# Patient Record
Sex: MALE | Race: WHITE | Employment: FULL TIME | URBAN - METROPOLITAN AREA
[De-identification: names, ages, dates, MRNs, and addresses within clinical notes are randomized per-mention and may not be internally consistent; named-entity substitution may affect disease eponyms.]

---

## 2017-01-11 ENCOUNTER — OFFICE VISIT (OUTPATIENT)
Dept: SURGERY | Facility: CLINIC | Age: 42
End: 2017-01-11

## 2017-01-11 ENCOUNTER — TELEPHONE (OUTPATIENT)
Dept: SURGERY | Facility: CLINIC | Age: 42
End: 2017-01-11

## 2017-01-11 VITALS
RESPIRATION RATE: 20 BRPM | TEMPERATURE: 97 F | HEART RATE: 65 BPM | BODY MASS INDEX: 33.13 KG/M2 | HEIGHT: 73 IN | WEIGHT: 250 LBS | DIASTOLIC BLOOD PRESSURE: 73 MMHG | SYSTOLIC BLOOD PRESSURE: 128 MMHG

## 2017-01-11 DIAGNOSIS — Z98.52 VASECTOMY STATUS: Primary | ICD-10-CM

## 2017-01-11 DIAGNOSIS — R35.1 NOCTURIA: ICD-10-CM

## 2017-01-11 DIAGNOSIS — Z30.09 VASECTOMY EVALUATION: Primary | ICD-10-CM

## 2017-01-11 DIAGNOSIS — Z84.1 FAMILY HISTORY OF KIDNEY STONE: ICD-10-CM

## 2017-01-11 DIAGNOSIS — Z80.42 FAMILY HISTORY OF PROSTATE CANCER: ICD-10-CM

## 2017-01-11 PROCEDURE — 99213 OFFICE O/P EST LOW 20 MIN: CPT | Performed by: UROLOGY

## 2017-01-11 PROCEDURE — 99244 OFF/OP CNSLTJ NEW/EST MOD 40: CPT | Performed by: UROLOGY

## 2017-01-11 RX ORDER — DIAZEPAM 5 MG/1
TABLET ORAL
Qty: 3 TABLET | Refills: 0 | Status: SHIPPED | OUTPATIENT
Start: 2017-01-11 | End: 2017-06-16

## 2017-01-11 RX ORDER — HYDROCODONE BITARTRATE AND ACETAMINOPHEN 5; 325 MG/1; MG/1
TABLET ORAL
Qty: 20 TABLET | Refills: 0 | Status: SHIPPED | OUTPATIENT
Start: 2017-01-11 | End: 2017-06-16

## 2017-01-11 RX ORDER — CEFADROXIL 500 MG/1
CAPSULE ORAL
Qty: 10 CAPSULE | Refills: 0 | Status: SHIPPED | OUTPATIENT
Start: 2017-01-11 | End: 2017-06-16

## 2017-01-11 NOTE — PATIENT INSTRUCTIONS
1.  Please continue  birth control precautions without exception, every time you have intercourse,  until further notice    2. Proceed with plans for voluntary sterilization -- bilateral vasectomy     3.   NO aspirin or NSAIDs (medications such as Advil, M On the other hand,  if you choose to have the vasectomy at   the surgery center , [facility],    please take the diazepam and hydrocodone 30 minutes before the actual start time of the procedure and not necessarily when you arrive at th

## 2017-01-11 NOTE — PROGRESS NOTES
Donald Alonzo is a 39year old male. HPI:   Patient presents with:  Vasectomy: No urinary concerns. Sexually active. Has 2 children. History provided by patient.       DESIRES VOLUNTARY STERILIZATION                 Patient is 39 y.o  and his wif HISTORY:  Past Medical History   Diagnosis Date   • Contusion      as child      History reviewed. No pertinent past surgical history.    Family History   Problem Relation Age of Onset   • Genito-Urinary Disorder Father      prostatitis   • Hypertensi bruising  Integumentary:  Negative for pruritus and rash  Musculoskeletal:  Negative for bone/joint symptoms  Psychiatric:  Negative for inappropriate interaction and psychiatric symptoms  Respiratory:  Negative for cough, dyspnea and wheezing      PHYSICA is 32.99 kg/(m^2).         LABORATORIES    12/6/16 PSA 0.8  12/6/16 Creatinine 1.13, eGFR >60      UROLOGICAL IMAGING     None    I explained to patient and he understands that there was one research group that argued that there is an increased risk of pros leave it up to the patient to decide and asked him to let us know if he wants to do that, and we would give him the order for a post vasectomy semen analysis.   I explained to him and he also understands that this operation should be viewed as irreversible (primary encounter diagnosis)  Plan: Please see below        Diagnoses and discussion---           1)  Voluntary sterilization --- By the end of the evaluation, patient decides that he definitely wants to undergo voluntary sterilization.   I explained to grady (medications such as Advil, Motrin, Aleve, ibuprofen) for 10 days before the procedure and none for the first 2 days after the procedure. It is okay to take Tylenol or acetaminophen, but not other painkillers,   before the procedure    4.   Please stop ome necessarily when you arrive at the surgery center. 13.  Lab tests --urinalysis urine test today         Gibson Kennedy  is a very pleasant patient and I thoroughly enjoyed evaluating him  in consultation. I thank you for sending the patient to see me.   I

## 2017-01-11 NOTE — TELEPHONE ENCOUNTER
Spoke with Reza Moran at [facility] a Ref # [id number] was given.

## 2017-01-12 ENCOUNTER — TELEPHONE (OUTPATIENT)
Dept: SURGERY | Facility: CLINIC | Age: 42
End: 2017-01-12

## 2017-01-12 NOTE — TELEPHONE ENCOUNTER
Called patient informing patient that there's a opening tomorrow in the office for jody. Patient returned call and stated that he'll wait until February 9, which was original date. Patient was very thankful.     Thanks, Wes Zurita

## 2017-01-12 NOTE — TELEPHONE ENCOUNTER
Shankar Godinez,     This procedure is considered an in-office procedure, his HMO manage organization will not allow it in the surgery center. This information can be better explained by the management organization, [facility] that manages his policy.      Thank you,   T

## 2017-02-08 ENCOUNTER — TELEPHONE (OUTPATIENT)
Dept: SURGERY | Facility: CLINIC | Age: 42
End: 2017-02-08

## 2017-02-08 NOTE — TELEPHONE ENCOUNTER
Received call from surgery center stating that the referral that is in the system is for in office that the referral needs to be changed to the [place] [facility] center.  Referral was edited and submitted to [facility] for approval. After further review th

## 2017-04-18 ENCOUNTER — TELEPHONE (OUTPATIENT)
Dept: SURGERY | Facility: CLINIC | Age: 42
End: 2017-04-18

## 2017-04-21 NOTE — TELEPHONE ENCOUNTER
Called patient informed that procedure should be over by 10:00. Patient agreed answered all questions.  Thanks

## 2017-04-28 ENCOUNTER — OFFICE VISIT (OUTPATIENT)
Dept: SURGERY | Facility: CLINIC | Age: 42
End: 2017-04-28

## 2017-04-28 VITALS
RESPIRATION RATE: 16 BRPM | WEIGHT: 250 LBS | DIASTOLIC BLOOD PRESSURE: 76 MMHG | HEART RATE: 69 BPM | TEMPERATURE: 97 F | SYSTOLIC BLOOD PRESSURE: 108 MMHG | HEIGHT: 72.5 IN | BODY MASS INDEX: 33.49 KG/M2

## 2017-04-28 DIAGNOSIS — Z30.8 POSTVASECTOMY SPERM COUNT: ICD-10-CM

## 2017-04-28 DIAGNOSIS — Z30.2 ENCOUNTER FOR VASECTOMY: Primary | ICD-10-CM

## 2017-04-28 DIAGNOSIS — Z30.2 ENCOUNTER FOR STERILIZATION: ICD-10-CM

## 2017-04-28 PROCEDURE — 55250 REMOVAL OF SPERM DUCT(S): CPT | Performed by: UROLOGY

## 2017-04-28 PROCEDURE — 88302 TISSUE EXAM BY PATHOLOGIST: CPT | Performed by: UROLOGY

## 2017-04-28 PROCEDURE — 99070 SPECIAL SUPPLIES PHYS/QHP: CPT | Performed by: UROLOGY

## 2017-04-28 PROCEDURE — A4550 SURGICAL TRAYS: HCPCS | Performed by: UROLOGY

## 2017-04-28 NOTE — PATIENT INSTRUCTIONS
.1.   Please continue birth control precautions until further notice. 2.   Please apply ice pack to operative area intermittently today. 3.   Please finish your antibiotic as directed    4.    For pain control in the first 24 hours, take either Tyleno

## 2017-05-12 ENCOUNTER — TELEPHONE (OUTPATIENT)
Dept: FAMILY MEDICINE CLINIC | Facility: CLINIC | Age: 42
End: 2017-05-12

## 2017-05-12 ENCOUNTER — TELEPHONE (OUTPATIENT)
Dept: SURGERY | Facility: CLINIC | Age: 42
End: 2017-05-12

## 2017-05-12 DIAGNOSIS — Z98.52 S/P VASECTOMY: Primary | ICD-10-CM

## 2017-05-12 NOTE — TELEPHONE ENCOUNTER
Pt calling regarding needing a referral for Dr. Sasha Edwards. ... please advise when referral is ready

## 2017-05-19 NOTE — TELEPHONE ENCOUNTER
Spoke with pt and gave an appt for 6/13 at 2pm and rhe said that he has the containers and the orders for the semen analysis and I gave instructions for collection and where to bring the specimen.

## 2017-05-24 ENCOUNTER — TELEPHONE (OUTPATIENT)
Dept: SURGERY | Facility: CLINIC | Age: 42
End: 2017-05-24

## 2017-05-24 NOTE — TELEPHONE ENCOUNTER
Spoke with pt and informed that we need to reschd. His appt from tues. 6/13 at 1:40pm to to Friday 6/16 at 2:40pm and pt agreed.

## 2017-06-13 ENCOUNTER — TELEPHONE (OUTPATIENT)
Dept: SURGERY | Facility: CLINIC | Age: 42
End: 2017-06-13

## 2017-06-13 ENCOUNTER — APPOINTMENT (OUTPATIENT)
Dept: LAB | Facility: HOSPITAL | Age: 42
End: 2017-06-13
Attending: UROLOGY
Payer: COMMERCIAL

## 2017-06-13 DIAGNOSIS — Z30.8 POSTVASECTOMY SPERM COUNT: ICD-10-CM

## 2017-06-13 PROCEDURE — 89321 SEMEN ANAL SPERM DETECTION: CPT

## 2017-06-13 NOTE — TELEPHONE ENCOUNTER
pts wife called. Her  has a vasectomy recently. They both have questions as to what the next step is. They are both confused.

## 2017-06-16 ENCOUNTER — OFFICE VISIT (OUTPATIENT)
Dept: SURGERY | Facility: CLINIC | Age: 42
End: 2017-06-16

## 2017-06-16 VITALS
HEART RATE: 73 BPM | BODY MASS INDEX: 33.49 KG/M2 | HEIGHT: 72.5 IN | SYSTOLIC BLOOD PRESSURE: 128 MMHG | RESPIRATION RATE: 18 BRPM | WEIGHT: 250 LBS | DIASTOLIC BLOOD PRESSURE: 77 MMHG | TEMPERATURE: 98 F

## 2017-06-16 DIAGNOSIS — Z98.52 S/P VASECTOMY: ICD-10-CM

## 2017-06-16 DIAGNOSIS — Z30.8 POSTVASECTOMY SPERM COUNT: Primary | ICD-10-CM

## 2017-06-16 NOTE — PATIENT INSTRUCTIONS
1.   Continue birth control precautions without exception    2.   Please submit another postvasectomy semen analysis in 6 weeks; after submitting specimen, please leave message with office the following day that you are calling for the results and we will g

## 2017-06-16 NOTE — PROGRESS NOTES
The patient comes in for his first visit after his office bilateral vasectomy several weeks ago; 4/30/17. Wife on speaker phone. He has no complaints. He denies any significant pain. He denies any scrotal swelling or wound infections.    On physical exam, t 59. He was advised to start testing 10 years prior or at age 54 as AUA guidelines recommend. He indicates his understanding. Treatment Plan & Patient Instructions  1. Continue birth control precautions without exception    2.   Please submit another post

## 2017-07-17 ENCOUNTER — APPOINTMENT (OUTPATIENT)
Dept: LAB | Facility: HOSPITAL | Age: 42
End: 2017-07-17
Attending: UROLOGY
Payer: COMMERCIAL

## 2017-07-17 DIAGNOSIS — Z30.8 POSTVASECTOMY SPERM COUNT: ICD-10-CM

## 2017-07-17 PROCEDURE — 89321 SEMEN ANAL SPERM DETECTION: CPT

## 2017-07-19 ENCOUNTER — TELEPHONE (OUTPATIENT)
Dept: SURGERY | Facility: CLINIC | Age: 42
End: 2017-07-19

## 2017-07-21 NOTE — TELEPHONE ENCOUNTER
Please call patient's wife or patient back and let them know that I sent patient lab results by means of \"my chart\" and please asked them to look at that.   Many thanks, Dr. Sourav [address]

## 2018-07-19 ENCOUNTER — TELEPHONE (OUTPATIENT)
Dept: SURGERY | Facility: CLINIC | Age: 43
End: 2018-07-19

## 2018-07-19 ENCOUNTER — APPOINTMENT (OUTPATIENT)
Dept: LAB | Facility: HOSPITAL | Age: 43
End: 2018-07-19
Attending: NURSE PRACTITIONER
Payer: COMMERCIAL

## 2018-07-19 DIAGNOSIS — Z98.52 STATUS POST VASECTOMY: ICD-10-CM

## 2018-07-19 PROCEDURE — 89321 SEMEN ANAL SPERM DETECTION: CPT

## 2018-09-07 ENCOUNTER — OFFICE VISIT (OUTPATIENT)
Dept: FAMILY MEDICINE CLINIC | Facility: CLINIC | Age: 43
End: 2018-09-07

## 2018-09-07 VITALS
DIASTOLIC BLOOD PRESSURE: 68 MMHG | WEIGHT: 251 LBS | TEMPERATURE: 98 F | BODY MASS INDEX: 34 KG/M2 | SYSTOLIC BLOOD PRESSURE: 103 MMHG | HEART RATE: 67 BPM

## 2018-09-07 DIAGNOSIS — M76.61 ACHILLES TENDINITIS, RIGHT LEG: Primary | ICD-10-CM

## 2018-09-07 PROCEDURE — 99213 OFFICE O/P EST LOW 20 MIN: CPT | Performed by: FAMILY MEDICINE

## 2018-09-07 PROCEDURE — 99212 OFFICE O/P EST SF 10 MIN: CPT | Performed by: FAMILY MEDICINE

## 2018-09-07 RX ORDER — METHYLPREDNISOLONE 4 MG/1
TABLET ORAL
Qty: 1 PACKAGE | Refills: 0 | Status: SHIPPED | OUTPATIENT
Start: 2018-09-07 | End: 2018-09-25

## 2018-09-07 NOTE — PROGRESS NOTES
HPI:    Patient ID: Zay Sanders is a 43year old male. HPI  Patient presents with a complaint of right ankle pain discomfort he was playing softball after hitting the ball and running to first base he felt a sudden strain down his calf and ankle.   L

## 2018-09-13 ENCOUNTER — OFFICE VISIT (OUTPATIENT)
Dept: FAMILY MEDICINE CLINIC | Facility: CLINIC | Age: 43
End: 2018-09-13

## 2018-09-13 VITALS
WEIGHT: 251 LBS | BODY MASS INDEX: 34 KG/M2 | DIASTOLIC BLOOD PRESSURE: 73 MMHG | SYSTOLIC BLOOD PRESSURE: 112 MMHG | HEART RATE: 61 BPM

## 2018-09-13 DIAGNOSIS — M76.61 ACHILLES TENDINITIS, RIGHT LEG: Primary | ICD-10-CM

## 2018-09-13 PROCEDURE — 99214 OFFICE O/P EST MOD 30 MIN: CPT | Performed by: FAMILY MEDICINE

## 2018-09-13 PROCEDURE — 99212 OFFICE O/P EST SF 10 MIN: CPT | Performed by: FAMILY MEDICINE

## 2018-09-13 RX ORDER — DICLOFENAC SODIUM 75 MG/1
75 TABLET, DELAYED RELEASE ORAL 2 TIMES DAILY
Qty: 60 TABLET | Refills: 1 | Status: ON HOLD | OUTPATIENT
Start: 2018-09-13 | End: 2018-09-26

## 2018-09-13 NOTE — PROGRESS NOTES
HPI:    Patient ID: Yohannes Flanagan is a 43year old male. HPI  Patient presents with: Foot Pain: f/u right achilles tendonitis  only little improvement on medication and relative rest.   Review of Systems   Constitutional: Negative.     Musculoskeletal

## 2018-09-20 ENCOUNTER — HOSPITAL ENCOUNTER (OUTPATIENT)
Dept: GENERAL RADIOLOGY | Facility: HOSPITAL | Age: 43
Discharge: HOME | End: 2018-09-20
Attending: FAMILY MEDICINE
Payer: COMMERCIAL

## 2018-09-20 ENCOUNTER — HOSPITAL ENCOUNTER (OUTPATIENT)
Dept: MRI IMAGING | Facility: HOSPITAL | Age: 43
Discharge: HOME | End: 2018-09-20
Attending: FAMILY MEDICINE
Payer: COMMERCIAL

## 2018-09-20 DIAGNOSIS — M76.61 ACHILLES TENDINITIS, RIGHT LEG: ICD-10-CM

## 2018-09-20 PROCEDURE — 73721 MRI JNT OF LWR EXTRE W/O DYE: CPT | Performed by: FAMILY MEDICINE

## 2018-09-20 PROCEDURE — 73610 X-RAY EXAM OF ANKLE: CPT | Performed by: FAMILY MEDICINE

## 2018-09-21 ENCOUNTER — TELEPHONE (OUTPATIENT)
Dept: FAMILY MEDICINE CLINIC | Facility: CLINIC | Age: 43
End: 2018-09-21

## 2018-09-21 NOTE — TELEPHONE ENCOUNTER
Patient has an Achilles tendon tear on MRI. Needs to be seen next week. Has a later date appointment with podiatry but I need him in next week . If podiatry cant then prefer to see Dr. Arpan Monzon.

## 2018-09-22 NOTE — TELEPHONE ENCOUNTER
Spoke with patient and made him aware of Dr. Jose Bowman message. Patient voiced understanding and an appointment was scheduled for 9/24/18 for follow-up. Message also routed to podiatry to confirm if patient can be seen sooner than 10/3/18.

## 2018-09-24 ENCOUNTER — TELEPHONE (OUTPATIENT)
Dept: INTERNAL MEDICINE CLINIC | Facility: CLINIC | Age: 43
End: 2018-09-24

## 2018-09-24 DIAGNOSIS — S86.019S RUPTURE OF ACHILLES TENDON, UNSPECIFIED LATERALITY, SEQUELA: Primary | ICD-10-CM

## 2018-09-24 NOTE — TELEPHONE ENCOUNTER
Pt has an appt on 9/25 with Dr Marshall Reaves for an achilles tendon tear. Pt needs a referral to be seen.  Thank you

## 2018-09-25 ENCOUNTER — HOSPITAL ENCOUNTER (OUTPATIENT)
Dept: ULTRASOUND IMAGING | Facility: HOSPITAL | Age: 43
Discharge: HOME | End: 2018-09-25
Attending: ORTHOPAEDIC SURGERY
Payer: COMMERCIAL

## 2018-09-25 ENCOUNTER — OFFICE VISIT (OUTPATIENT)
Dept: ORTHOPEDICS CLINIC | Facility: CLINIC | Age: 43
End: 2018-09-25

## 2018-09-25 DIAGNOSIS — S86.011A ACHILLES RUPTURE, RIGHT, INITIAL ENCOUNTER: ICD-10-CM

## 2018-09-25 DIAGNOSIS — S86.011A ACHILLES RUPTURE, RIGHT, INITIAL ENCOUNTER: Primary | ICD-10-CM

## 2018-09-25 PROCEDURE — 93971 EXTREMITY STUDY: CPT | Performed by: ORTHOPAEDIC SURGERY

## 2018-09-25 PROCEDURE — L4360 PNEUMAT WALKING BOOT PRE CST: HCPCS | Performed by: ORTHOPAEDIC SURGERY

## 2018-09-25 PROCEDURE — 99243 OFF/OP CNSLTJ NEW/EST LOW 30: CPT | Performed by: ORTHOPAEDIC SURGERY

## 2018-09-25 PROCEDURE — 99212 OFFICE O/P EST SF 10 MIN: CPT | Performed by: ORTHOPAEDIC SURGERY

## 2018-09-25 NOTE — H&P
9/25/2018  Supriya Hernandez  9/23/1975  37year old   male  Barak Stone MD    HPI:   Patient presents with:   Injury: Right ankle - onset 2 weeks ago while playing softball - has x-rays and MRI in the system - still has discomfort rated as 2-8 tobacco: Never Used      Tobacco comment: 4 CIGARETTES/DAY    Alcohol use: No      Alcohol/week: 0.0 oz      Comment: formerly-beer    Drug use: No          REVIEW OF SYSTEMS:   A 12 point review of systems was performed as documented on the intake form an neurovascular injury, need for further surgery, development of deep vein thrombosis, pulmonary emboli, and anesthesia problems. The patient understands the risks and wishes to proceed with surgery.        All of their questions were answered and they are in

## 2018-09-26 ENCOUNTER — HOSPITAL ENCOUNTER (OUTPATIENT)
Facility: HOSPITAL | Age: 43
Setting detail: HOSPITAL OUTPATIENT SURGERY
Discharge: HOME | End: 2018-09-26
Attending: ORTHOPAEDIC SURGERY | Admitting: ORTHOPAEDIC SURGERY
Payer: COMMERCIAL

## 2018-09-26 ENCOUNTER — ANESTHESIA EVENT (OUTPATIENT)
Dept: SURGERY | Facility: HOSPITAL | Age: 43
End: 2018-09-26
Payer: COMMERCIAL

## 2018-09-26 ENCOUNTER — ANESTHESIA (OUTPATIENT)
Dept: SURGERY | Facility: HOSPITAL | Age: 43
End: 2018-09-26
Payer: COMMERCIAL

## 2018-09-26 ENCOUNTER — TELEPHONE (OUTPATIENT)
Dept: ORTHOPEDICS CLINIC | Facility: CLINIC | Age: 43
End: 2018-09-26

## 2018-09-26 VITALS
TEMPERATURE: 98 F | SYSTOLIC BLOOD PRESSURE: 114 MMHG | OXYGEN SATURATION: 98 % | WEIGHT: 250 LBS | HEIGHT: 73 IN | RESPIRATION RATE: 16 BRPM | DIASTOLIC BLOOD PRESSURE: 61 MMHG | HEART RATE: 60 BPM | BODY MASS INDEX: 33.13 KG/M2

## 2018-09-26 PROCEDURE — 0LQN0ZZ REPAIR RIGHT LOWER LEG TENDON, OPEN APPROACH: ICD-10-PCS | Performed by: ORTHOPAEDIC SURGERY

## 2018-09-26 PROCEDURE — 99152 MOD SED SAME PHYS/QHP 5/>YRS: CPT | Performed by: ORTHOPAEDIC SURGERY

## 2018-09-26 PROCEDURE — 76942 ECHO GUIDE FOR BIOPSY: CPT | Performed by: ORTHOPAEDIC SURGERY

## 2018-09-26 PROCEDURE — 64447 NJX AA&/STRD FEMORAL NRV IMG: CPT | Performed by: ORTHOPAEDIC SURGERY

## 2018-09-26 PROCEDURE — 3E0T3BZ INTRODUCTION OF ANESTHETIC AGENT INTO PERIPHERAL NERVES AND PLEXI, PERCUTANEOUS APPROACH: ICD-10-PCS | Performed by: ANESTHESIOLOGY

## 2018-09-26 RX ORDER — FAMOTIDINE 20 MG/1
20 TABLET ORAL ONCE
Status: COMPLETED | OUTPATIENT
Start: 2018-09-26 | End: 2018-09-26

## 2018-09-26 RX ORDER — SODIUM CHLORIDE, SODIUM LACTATE, POTASSIUM CHLORIDE, CALCIUM CHLORIDE 600; 310; 30; 20 MG/100ML; MG/100ML; MG/100ML; MG/100ML
INJECTION, SOLUTION INTRAVENOUS CONTINUOUS
Status: DISCONTINUED | OUTPATIENT
Start: 2018-09-26 | End: 2018-09-26

## 2018-09-26 RX ORDER — HALOPERIDOL 5 MG/ML
0.25 INJECTION INTRAMUSCULAR ONCE AS NEEDED
Status: DISCONTINUED | OUTPATIENT
Start: 2018-09-26 | End: 2018-09-26

## 2018-09-26 RX ORDER — LIDOCAINE HYDROCHLORIDE 10 MG/ML
INJECTION, SOLUTION EPIDURAL; INFILTRATION; INTRACAUDAL; PERINEURAL AS NEEDED
Status: DISCONTINUED | OUTPATIENT
Start: 2018-09-26 | End: 2018-09-26 | Stop reason: SURG

## 2018-09-26 RX ORDER — CEFAZOLIN SODIUM/WATER 2 G/20 ML
2 SYRINGE (ML) INTRAVENOUS ONCE
Status: DISCONTINUED | OUTPATIENT
Start: 2018-09-26 | End: 2018-09-26 | Stop reason: HOSPADM

## 2018-09-26 RX ORDER — ASPIRIN 325 MG
325 TABLET ORAL DAILY
Qty: 14 TABLET | Refills: 0 | Status: SHIPPED | OUTPATIENT
Start: 2018-09-26 | End: 2018-11-06

## 2018-09-26 RX ORDER — ROPIVACAINE HYDROCHLORIDE 5 MG/ML
INJECTION, SOLUTION EPIDURAL; INFILTRATION; PERINEURAL
Status: COMPLETED | OUTPATIENT
Start: 2018-09-26 | End: 2018-09-26

## 2018-09-26 RX ORDER — ONDANSETRON 2 MG/ML
4 INJECTION INTRAMUSCULAR; INTRAVENOUS ONCE AS NEEDED
Status: DISCONTINUED | OUTPATIENT
Start: 2018-09-26 | End: 2018-09-26

## 2018-09-26 RX ORDER — ONDANSETRON 2 MG/ML
4 INJECTION INTRAMUSCULAR; INTRAVENOUS EVERY 6 HOURS PRN
Status: CANCELLED | OUTPATIENT
Start: 2018-09-26

## 2018-09-26 RX ORDER — ONDANSETRON 2 MG/ML
INJECTION INTRAMUSCULAR; INTRAVENOUS AS NEEDED
Status: DISCONTINUED | OUTPATIENT
Start: 2018-09-26 | End: 2018-09-26 | Stop reason: SURG

## 2018-09-26 RX ORDER — MORPHINE SULFATE 4 MG/ML
2 INJECTION, SOLUTION INTRAMUSCULAR; INTRAVENOUS EVERY 10 MIN PRN
Status: DISCONTINUED | OUTPATIENT
Start: 2018-09-26 | End: 2018-09-26

## 2018-09-26 RX ORDER — DEXAMETHASONE SODIUM PHOSPHATE 4 MG/ML
VIAL (ML) INJECTION AS NEEDED
Status: DISCONTINUED | OUTPATIENT
Start: 2018-09-26 | End: 2018-09-26 | Stop reason: SURG

## 2018-09-26 RX ORDER — NALOXONE HYDROCHLORIDE 0.4 MG/ML
80 INJECTION, SOLUTION INTRAMUSCULAR; INTRAVENOUS; SUBCUTANEOUS AS NEEDED
Status: DISCONTINUED | OUTPATIENT
Start: 2018-09-26 | End: 2018-09-26

## 2018-09-26 RX ORDER — HYDROCODONE BITARTRATE AND ACETAMINOPHEN 5; 325 MG/1; MG/1
1-2 TABLET ORAL EVERY 6 HOURS PRN
Qty: 40 TABLET | Refills: 0 | Status: SHIPPED | OUTPATIENT
Start: 2018-09-26 | End: 2018-11-06

## 2018-09-26 RX ORDER — HYDROCODONE BITARTRATE AND ACETAMINOPHEN 5; 325 MG/1; MG/1
1 TABLET ORAL AS NEEDED
Status: DISCONTINUED | OUTPATIENT
Start: 2018-09-26 | End: 2018-09-26

## 2018-09-26 RX ORDER — CEFAZOLIN SODIUM/WATER 2 G/20 ML
SYRINGE (ML) INTRAVENOUS AS NEEDED
Status: DISCONTINUED | OUTPATIENT
Start: 2018-09-26 | End: 2018-09-26 | Stop reason: SURG

## 2018-09-26 RX ORDER — ACETAMINOPHEN 500 MG
1000 TABLET ORAL ONCE
Status: COMPLETED | OUTPATIENT
Start: 2018-09-26 | End: 2018-09-26

## 2018-09-26 RX ORDER — ROCURONIUM BROMIDE 10 MG/ML
INJECTION, SOLUTION INTRAVENOUS AS NEEDED
Status: DISCONTINUED | OUTPATIENT
Start: 2018-09-26 | End: 2018-09-26 | Stop reason: SURG

## 2018-09-26 RX ORDER — LIDOCAINE HYDROCHLORIDE 10 MG/ML
INJECTION, SOLUTION INFILTRATION; PERINEURAL
Status: COMPLETED | OUTPATIENT
Start: 2018-09-26 | End: 2018-09-26

## 2018-09-26 RX ORDER — DEXAMETHASONE SODIUM PHOSPHATE 10 MG/ML
INJECTION, SOLUTION INTRAMUSCULAR; INTRAVENOUS
Status: COMPLETED | OUTPATIENT
Start: 2018-09-26 | End: 2018-09-26

## 2018-09-26 RX ORDER — METOCLOPRAMIDE 10 MG/1
10 TABLET ORAL ONCE
Status: COMPLETED | OUTPATIENT
Start: 2018-09-26 | End: 2018-09-26

## 2018-09-26 RX ORDER — HYDROCODONE BITARTRATE AND ACETAMINOPHEN 5; 325 MG/1; MG/1
2 TABLET ORAL AS NEEDED
Status: DISCONTINUED | OUTPATIENT
Start: 2018-09-26 | End: 2018-09-26

## 2018-09-26 RX ORDER — MORPHINE SULFATE 4 MG/ML
4 INJECTION, SOLUTION INTRAMUSCULAR; INTRAVENOUS EVERY 10 MIN PRN
Status: DISCONTINUED | OUTPATIENT
Start: 2018-09-26 | End: 2018-09-26

## 2018-09-26 RX ORDER — MAGNESIUM HYDROXIDE 1200 MG/15ML
LIQUID ORAL CONTINUOUS PRN
Status: COMPLETED | OUTPATIENT
Start: 2018-09-26 | End: 2018-09-26

## 2018-09-26 RX ORDER — MIDAZOLAM HYDROCHLORIDE 1 MG/ML
INJECTION INTRAMUSCULAR; INTRAVENOUS
Status: COMPLETED | OUTPATIENT
Start: 2018-09-26 | End: 2018-09-26

## 2018-09-26 RX ORDER — MORPHINE SULFATE 10 MG/ML
6 INJECTION, SOLUTION INTRAMUSCULAR; INTRAVENOUS EVERY 10 MIN PRN
Status: DISCONTINUED | OUTPATIENT
Start: 2018-09-26 | End: 2018-09-26

## 2018-09-26 RX ADMIN — ROPIVACAINE HYDROCHLORIDE 30 ML: 5 INJECTION, SOLUTION EPIDURAL; INFILTRATION; PERINEURAL at 10:49:00

## 2018-09-26 RX ADMIN — MIDAZOLAM HYDROCHLORIDE 2 MG: 1 INJECTION INTRAMUSCULAR; INTRAVENOUS at 10:49:00

## 2018-09-26 RX ADMIN — LIDOCAINE HYDROCHLORIDE 3 ML: 10 INJECTION, SOLUTION INFILTRATION; PERINEURAL at 10:49:00

## 2018-09-26 RX ADMIN — ONDANSETRON 4 MG: 2 INJECTION INTRAMUSCULAR; INTRAVENOUS at 11:38:00

## 2018-09-26 RX ADMIN — DEXAMETHASONE SODIUM PHOSPHATE 4 MG: 4 MG/ML VIAL (ML) INJECTION at 11:06:00

## 2018-09-26 RX ADMIN — DEXAMETHASONE SODIUM PHOSPHATE 4 MG: 10 INJECTION, SOLUTION INTRAMUSCULAR; INTRAVENOUS at 10:49:00

## 2018-09-26 RX ADMIN — SODIUM CHLORIDE, SODIUM LACTATE, POTASSIUM CHLORIDE, CALCIUM CHLORIDE: 600; 310; 30; 20 INJECTION, SOLUTION INTRAVENOUS at 11:58:00

## 2018-09-26 RX ADMIN — LIDOCAINE HYDROCHLORIDE 50 MG: 10 INJECTION, SOLUTION EPIDURAL; INFILTRATION; INTRACAUDAL; PERINEURAL at 11:00:00

## 2018-09-26 RX ADMIN — SODIUM CHLORIDE, SODIUM LACTATE, POTASSIUM CHLORIDE, CALCIUM CHLORIDE: 600; 310; 30; 20 INJECTION, SOLUTION INTRAVENOUS at 10:30:00

## 2018-09-26 RX ADMIN — ROCURONIUM BROMIDE 10 MG: 10 INJECTION, SOLUTION INTRAVENOUS at 11:00:00

## 2018-09-26 RX ADMIN — CEFAZOLIN SODIUM/WATER 2 G: 2 G/20 ML SYRINGE (ML) INTRAVENOUS at 11:07:00

## 2018-09-26 NOTE — ANESTHESIA PREPROCEDURE EVALUATION
Anesthesia PreOp Note    HPI:     Aleksandra Alonso is a 37year old male who presents for preoperative consultation requested by: Mohsen Johnson MD    Date of Surgery: 9/26/2018    Procedure(s):  ACHILLES TENDON REPAIR/LENGTHENING  Indication: Relation Age of Onset   • Genito-Urinary Disorder Father         prostatitis   • Hypertension Father    • Genito-Urinary Disorder Paternal Uncle         prostatitis     Social History    Socioeconomic History      Marital status:       Spouse name: 09/26/18  1012   BP:  126/72   BP Location:  Right arm   Pulse:  66   Resp:  18   Temp:  98.6 °F (37 °C)   TempSrc:  Oral   SpO2:  97%   Weight: 113.4 kg (250 lb) 113.4 kg (250 lb)   Height: 1.854 m (6' 1\") 1.854 m (6' 1\")        Anesthesia ROS/Med Hx an

## 2018-09-26 NOTE — TELEPHONE ENCOUNTER
Patient has a procedure that was added on with Dr. Katlyn Navarro for today. Patient has an HMO policy and a referral is required. Please enter a referral for this procedure.      Thank you,     Dana Esposito [phone]   ([phone])

## 2018-09-26 NOTE — BRIEF OP NOTE
Pre-Operative Diagnosis: Rupture of right Achilles tendon [S86.011A]     Post-Operative Diagnosis: Rupture of right Achilles tendon [S86.011A]      Procedure Performed:   Procedure(s):  right achilles tendon repair    Surgeon(s) and Role:     * Brayan Singh

## 2018-09-26 NOTE — OPERATIVE REPORT
[facility]    PATIENT'S NAME: Aisha Hernandez   ATTENDING PHYSICIAN: Robert Scott MD   OPERATING PHYSICIAN: Robert Scott MD   PATIENT ACCOUNT#:   [id number]    LOCATION:  [facility] PACU 5 [facility] 10  MEDICAL RECORD #:   [id number] well padded. The patient had a well-padded tourniquet placed on the right proximal thigh prior to prepping and draping the lower extremity. Prepping and draping of the right lower extremity was then performed. Perioperative antibiotics were infused.   Co MD  d: 09/26/2018 12:20:23  t: 09/26/2018 12:49:01  [facility] [id number]  GD/

## 2018-09-26 NOTE — INTERVAL H&P NOTE
Pre-op Diagnosis: Rupture of right Achilles tendon [S86.011A]    The above referenced H&P was reviewed by Nahun Hussein MD on 9/26/2018, the patient was examined and no significant changes have occurred in the patient's condition since the H&P was pe

## 2018-09-26 NOTE — ANESTHESIA POSTPROCEDURE EVALUATION
Patient: Radha Hernandez    Procedure Summary     Date:  09/26/18 Room / Location:  [address] [place] [address] MAIN OR 08 / [address] [place] [address] MAIN OR    Anesthesia Start:  7394 Anesthesia Stop:  1234    Procedure:  ACHILLES TENDON REPAIR/LENGTHENING (Right ) Diagnosis:       Rupture of

## 2018-09-26 NOTE — ANESTHESIA PROCEDURE NOTES
Peripheral Block  Date/Time: 9/26/2018 10:49 AM    Anesthesiologist:  Adalgisa Castillo MD  Performed by:   Anesthesiologist  Patient Location:  PACU  Start Time:  9/26/2018 10:43 AM  End Time:  9/26/2018 10:52 AM  Site Identification: ultrasound guided,

## 2018-09-28 NOTE — TELEPHONE ENCOUNTER
Theodore Senior, please specify exactly what referral is needed by Dr. [facility]. Thank you.

## 2018-09-29 ENCOUNTER — PATIENT MESSAGE (OUTPATIENT)
Dept: FAMILY MEDICINE CLINIC | Facility: CLINIC | Age: 43
End: 2018-09-29

## 2018-09-30 NOTE — TELEPHONE ENCOUNTER
From: Sharyle Poisson Slocum  To:  Freddie Tsai DO  Sent: 9/29/2018 7:22 PM CDT  Subject: Visit Follow-up Question    May need a referral for Dr. Reece Obregon for my follow up Appointment from having my Achilles tendon repaired surgically

## 2018-10-03 NOTE — TELEPHONE ENCOUNTER
Spoke to Layla and informed her that [employer] does ALL PA for [facility] Ortho surgeries

## 2018-10-09 ENCOUNTER — OFFICE VISIT (OUTPATIENT)
Dept: ORTHOPEDICS CLINIC | Facility: CLINIC | Age: 43
End: 2018-10-09

## 2018-10-09 VITALS — HEART RATE: 92 BPM | SYSTOLIC BLOOD PRESSURE: 128 MMHG | DIASTOLIC BLOOD PRESSURE: 81 MMHG | RESPIRATION RATE: 20 BRPM

## 2018-10-09 DIAGNOSIS — S86.011A ACHILLES RUPTURE, RIGHT, INITIAL ENCOUNTER: Primary | ICD-10-CM

## 2018-10-09 PROCEDURE — 99212 OFFICE O/P EST SF 10 MIN: CPT | Performed by: ORTHOPAEDIC SURGERY

## 2018-10-09 PROCEDURE — 99024 POSTOP FOLLOW-UP VISIT: CPT | Performed by: ORTHOPAEDIC SURGERY

## 2018-10-09 NOTE — PROGRESS NOTES
This is a pleasant 51-year-old male that is 2 weeks status post right Achilles tendon repair. He has had no fevers, chills, chest pain, shortness of breath. He comes in today for repeat evaluation.     On exam, the patient has a healing incision about the

## 2018-11-06 ENCOUNTER — OFFICE VISIT (OUTPATIENT)
Dept: ORTHOPEDICS CLINIC | Facility: CLINIC | Age: 43
End: 2018-11-06

## 2018-11-06 DIAGNOSIS — S86.011A ACHILLES RUPTURE, RIGHT, INITIAL ENCOUNTER: Primary | ICD-10-CM

## 2018-11-06 PROCEDURE — 99212 OFFICE O/P EST SF 10 MIN: CPT | Performed by: ORTHOPAEDIC SURGERY

## 2018-11-06 PROCEDURE — 99024 POSTOP FOLLOW-UP VISIT: CPT | Performed by: ORTHOPAEDIC SURGERY

## 2018-11-07 NOTE — PROGRESS NOTES
This is a pleasant 24-year-old male that is 6 weeks status post right Achilles tendon repair. He has had no chest pain or shortness of breath. He comes in today for repeat evaluation.     On exam, the patient has a well-healed incision over the posterior

## 2018-11-09 ENCOUNTER — OFFICE VISIT (OUTPATIENT)
Dept: PHYSICAL THERAPY | Age: 43
End: 2018-11-09
Attending: FAMILY MEDICINE
Payer: COMMERCIAL

## 2018-11-09 DIAGNOSIS — S86.011A ACHILLES RUPTURE, RIGHT, INITIAL ENCOUNTER: ICD-10-CM

## 2018-11-09 PROCEDURE — 97161 PT EVAL LOW COMPLEX 20 MIN: CPT | Performed by: PHYSICAL THERAPIST

## 2018-11-09 PROCEDURE — 97530 THERAPEUTIC ACTIVITIES: CPT | Performed by: PHYSICAL THERAPIST

## 2018-11-09 PROCEDURE — 97110 THERAPEUTIC EXERCISES: CPT | Performed by: PHYSICAL THERAPIST

## 2018-11-09 NOTE — PROGRESS NOTES
LOWER EXTREMITY EVALUATION:   Referring Physician: Maggie Tam MD    Diagnosis: Achilles rupture, right, initial encounter (A82.120Y) Date of Service: 11/9/2018   Date of Onset: 9/7/2018 Date of Surgery: 9/26/2018      SUBJECTIVE:   PATIENT SUMM weeks.     As patient's ROM increases, may take out heel lifts.     Once patient is footflat, may start progressive WB  goal is to place patient in shoes at week 6 post op.     HEP    Red Flag Screening Comments   Age over 48 N   Bowel or bladder Dysfuncti R: 59.3 L:   57.5       Myotome Testing:   -/5, R/L **Deferred due to post op status**   11/9/2018   Hip Flexion R:  L:     Knee Extension R:  L:   Knee Flexion R:  L:   Ankle DF R:  L:   Ankle Eversion R:  L:   Great Toe Extension R:  L:     Palpation: Te 11/9/2018  To:  2/7/2019    This plan was discussed and agreed upon by: patient   Patient was advised of these findings, precautions, and treatment options and has agreed to actively participate in planning and for this course of care.     Thank you for you

## 2018-11-13 ENCOUNTER — OFFICE VISIT (OUTPATIENT)
Dept: PHYSICAL THERAPY | Age: 43
End: 2018-11-13
Attending: ORTHOPAEDIC SURGERY
Payer: COMMERCIAL

## 2018-11-13 PROCEDURE — 97140 MANUAL THERAPY 1/> REGIONS: CPT

## 2018-11-13 PROCEDURE — 97110 THERAPEUTIC EXERCISES: CPT

## 2018-11-13 NOTE — PROGRESS NOTES
Diagnosis: Achilles rupture, right, initial encounter (I87.474T)    Authorized # of Visits: 12  [facility]            Next MD visit: December 6, 2016       Fall Risk: standard             Precautions:  S/p achilles tendon repair September 26, 2 upon discharge to aide with symptom management and return to previous level of function.    2. 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   3. Patient w

## 2018-11-15 ENCOUNTER — OFFICE VISIT (OUTPATIENT)
Dept: PHYSICAL THERAPY | Age: 43
End: 2018-11-15
Attending: ORTHOPAEDIC SURGERY
Payer: COMMERCIAL

## 2018-11-15 PROCEDURE — 97110 THERAPEUTIC EXERCISES: CPT

## 2018-11-15 PROCEDURE — 97140 MANUAL THERAPY 1/> REGIONS: CPT

## 2018-11-15 NOTE — PROGRESS NOTES
Diagnosis: Achilles rupture, right, initial encounter (Z93.925I)    Authorized # of Visits: 12  Insurance: [employer] [facility]            Next MD visit: December 6, 2016       Fall Risk: standard             Precautions:  S/p achilles tendon repair September 26, 2 20x    Standing hamstring curl (boot) 20x    Great toe flex/ext isometrics (therapist OP) 10 x 5 cts                         HEP: knee bends; SLR (with boot)    HEP: HEP:  HEP:         Manual Therapy:   Prone Soft tissue massage / light effleurage gastroc, TotalTreatment Time: 45 min

## 2018-11-20 ENCOUNTER — OFFICE VISIT (OUTPATIENT)
Dept: PHYSICAL THERAPY | Age: 43
End: 2018-11-20
Attending: ORTHOPAEDIC SURGERY
Payer: COMMERCIAL

## 2018-11-20 PROCEDURE — 97140 MANUAL THERAPY 1/> REGIONS: CPT

## 2018-11-20 PROCEDURE — 97110 THERAPEUTIC EXERCISES: CPT

## 2018-11-20 NOTE — PROGRESS NOTES
Diagnosis: Achilles rupture, right, initial encounter (P48.986I)    Authorized # of Visits: 12  Insurance: [employer]            Next MD visit: December 18, 2016       Fall Risk: standard             Precautions:  S/p achilles tendon repair September 26, 10 cts    Great toe light isometrics 10 x 10 cts     Sdly hip \"boxes\" F/B 15x     Sci Fit (with boot) weight through heel L0 distance 11 x 8 min                           HEP: knee bends; SLR (with boot)    HEP: HEP:  HEP:         Manual Therapy:   Prone

## 2018-11-23 ENCOUNTER — OFFICE VISIT (OUTPATIENT)
Dept: PHYSICAL THERAPY | Age: 43
End: 2018-11-23
Attending: ORTHOPAEDIC SURGERY
Payer: COMMERCIAL

## 2018-11-23 PROCEDURE — 97110 THERAPEUTIC EXERCISES: CPT | Performed by: PHYSICAL THERAPIST

## 2018-11-23 PROCEDURE — 97140 MANUAL THERAPY 1/> REGIONS: CPT | Performed by: PHYSICAL THERAPIST

## 2018-11-23 NOTE — PROGRESS NOTES
Diagnosis: Achilles rupture, right, initial encounter (S41.015E)    Authorized # of Visits: 12  Insurance: [employer] [facility]            Next MD visit: December 18, 2016       Fall Risk: standard             Precautions:  S/p achilles tendon repair September 26, x 10 cts    Great toe light isometrics 10 x 10 cts     Sdly hip \"boxes\" F/B 15x     Sci Fit (with boot) weight through heel L0 distance 11 x 8 min           Therapeutic Exercise    Supine active R ankle DF/PF 20x     Supine active R ankle CW/CCW 15x    R

## 2018-11-27 ENCOUNTER — OFFICE VISIT (OUTPATIENT)
Dept: PHYSICAL THERAPY | Age: 43
End: 2018-11-27
Attending: ORTHOPAEDIC SURGERY
Payer: COMMERCIAL

## 2018-11-27 PROCEDURE — 97140 MANUAL THERAPY 1/> REGIONS: CPT | Performed by: PHYSICAL THERAPIST

## 2018-11-27 PROCEDURE — 97110 THERAPEUTIC EXERCISES: CPT | Performed by: PHYSICAL THERAPIST

## 2018-11-27 PROCEDURE — 97112 NEUROMUSCULAR REEDUCATION: CPT | Performed by: PHYSICAL THERAPIST

## 2018-11-27 NOTE — PROGRESS NOTES
Diagnosis: Achilles rupture, right, initial encounter (Z32.543H)    Authorized # of Visits: 12  Insurance: [employer] [facility]            Next MD visit: December 18, 2016       Fall Risk: standard             Precautions:  S/p achilles tendon repair September 26, isometrics DF/PF; INV/EVR 10 x 10 cts    Great toe light isometrics 10 x 10 cts     Sdly hip \"boxes\" F/B 15x     Sci Fit (with boot) weight through heel L0 distance 11 x 8 min           Therapeutic Exercise    Supine active R ankle DF/PF 20x     Supine a score to less than or equal to 25% impairment, for functional improvement in ADLs.  Currently 66% impaired.      Plan:   Decrease pain/swelling; improve scar mobility, R ankle ROM/mobility, flexibility, strength/stability, balance, gait, and return to work

## 2018-11-30 ENCOUNTER — OFFICE VISIT (OUTPATIENT)
Dept: PHYSICAL THERAPY | Age: 43
End: 2018-11-30
Attending: ORTHOPAEDIC SURGERY
Payer: COMMERCIAL

## 2018-11-30 PROCEDURE — 97140 MANUAL THERAPY 1/> REGIONS: CPT | Performed by: PHYSICAL THERAPIST

## 2018-11-30 PROCEDURE — 97112 NEUROMUSCULAR REEDUCATION: CPT | Performed by: PHYSICAL THERAPIST

## 2018-11-30 PROCEDURE — 97110 THERAPEUTIC EXERCISES: CPT | Performed by: PHYSICAL THERAPIST

## 2018-11-30 NOTE — PROGRESS NOTES
Diagnosis: Achilles rupture, right, initial encounter (K02.037L)    Authorized # of Visits: 12  Insurance: [address] [facility]            Next MD visit: December 18, 2016       Fall Risk: standard             Precautions:  S/p achilles tendon repair September 26, Therapeutic Exercise    Supine active R ankle DF/PF 20x     Supine active R ankle CW/CCW 15x    R ankle light isometrics DF/PF; INV/EVR 10 x 10 cts    Prone hip ext (no boot) 2 x10, bilaterally    Prone knee bends ( no boot) 15x    Sci Fit (with boot) mukul work.  3. Patient will increase strength by one full MMT grade for foot intrinsics and LE musculature to improve foot posture and normalize mechanics for stairclimbing.   4. Patient to improve FOTO outcomes score to less than or equal to 25% impairment, for

## 2018-12-04 ENCOUNTER — OFFICE VISIT (OUTPATIENT)
Dept: PHYSICAL THERAPY | Age: 43
End: 2018-12-04
Attending: ORTHOPAEDIC SURGERY
Payer: COMMERCIAL

## 2018-12-04 PROCEDURE — 97110 THERAPEUTIC EXERCISES: CPT

## 2018-12-04 NOTE — PROGRESS NOTES
Diagnosis: Achilles rupture, right, initial encounter (H38.351C)    Authorized # of Visits: 12  Insurance: [employer] [place] [facility]            Next MD visit: December 18, 2016       Fall Risk: standard             Precautions:  S/p achilles tendon repair September 26, cts    Long sitting R ankle DF/PF; INV/EVR OTB 2 x 15      Sitbacks 2 x 15     Lunges (weight transfers) R forward 2 x 10    Step ups R 15x     Sci Fit (with boot) weight through heel L1, L2 distance 11 x 8 min        Remove final heel lift tomorrow 11/28/

## 2018-12-06 ENCOUNTER — OFFICE VISIT (OUTPATIENT)
Dept: PHYSICAL THERAPY | Age: 43
End: 2018-12-06
Attending: ORTHOPAEDIC SURGERY
Payer: COMMERCIAL

## 2018-12-06 PROCEDURE — 97110 THERAPEUTIC EXERCISES: CPT

## 2018-12-06 NOTE — PROGRESS NOTES
Diagnosis: Achilles rupture, right, initial encounter (D72.773D)    Authorized # of Visits: 12  Insurance: [facility]            Next MD visit: December 18, 2016       Fall Risk: standard             Precautions:  S/p achilles tendon repair September 26, 0 min         Assessment:   Pt will bring his shoe to therapy starting next week to gradually progress from CAM boot to shoe prior to his physician visit. Progression of WB and balance through RLE with CAM boot as well as R hip strengthening.  Noted minim

## 2018-12-10 ENCOUNTER — OFFICE VISIT (OUTPATIENT)
Dept: PHYSICAL THERAPY | Age: 43
End: 2018-12-10
Attending: ORTHOPAEDIC SURGERY
Payer: COMMERCIAL

## 2018-12-10 PROCEDURE — 97110 THERAPEUTIC EXERCISES: CPT | Performed by: PHYSICAL THERAPIST

## 2018-12-10 PROCEDURE — 97112 NEUROMUSCULAR REEDUCATION: CPT | Performed by: PHYSICAL THERAPIST

## 2018-12-11 NOTE — PROGRESS NOTES
LOWER EXTREMITY RE-EVALUATION:   [address] REQUEST     Name: Lima Grijalva   Referring Physician: Deborah Fernandez MD    Diagnosis: Achilles rupture, right, initial encounter (X52.442I) Date of Service: 12/10/2018    Date of Onset: 9/7 Palpation: Tenderness to palpation at posterior lower leg, achilles region         Visit # 7  Date: 11/30/2018  Visit # 8  Date: 12/4/18 Visit # 9  Date: 12/6/18 Visit # 10  Date: 12/10/18   Therapeutic Exercise    Seated active R ankle DF/PF 20x goals. He will benefit from progression of interventions and exercises to restore ROM, strength and mobility, returning patient to PLOF. Initiated weightbearing in shoes today, bilaterally.  Provided cues for weight acceptance, weight shift and gait mechan care.    Thank you for your referral and the opportunity to assist in your patient's rehabilitation. Please co-sign or sign and return this letter via fax as soon as possible to [phone].  If you have any question please contact me at [phone]    [facility]

## 2018-12-13 ENCOUNTER — OFFICE VISIT (OUTPATIENT)
Dept: PHYSICAL THERAPY | Age: 43
End: 2018-12-13
Attending: ORTHOPAEDIC SURGERY
Payer: COMMERCIAL

## 2018-12-13 PROCEDURE — 97116 GAIT TRAINING THERAPY: CPT

## 2018-12-13 PROCEDURE — 97110 THERAPEUTIC EXERCISES: CPT

## 2018-12-13 NOTE — PROGRESS NOTES
Insurance: [employer] [facility]     Next MD visit: December 18, 2016       Fall Risk: standard  Medication Changes since last visit?: No             Precautions:  S/p achilles tendon repair September 26, 2018  Start motion and strenghtening in patient's current arc distance 11 x 8 min    SLS on R 3 x 20 cts     Sitbacks 2 x 15     Lateral walk 4x20 feet    Reassessment completed      Neuromuscular Re-ed  Standing weightshift at barre 2x10: frontal, sagittal in shoe    Block training for weight acceptance at RLE (heel improve scar mobility, R ankle ROM/mobility, flexibility, strength/stability, balance, gait, and facilitate return to work.       Charges: there ex x 2, gait x1  Total Timed Treatment: 42 min      TotalTreatment Time: 45 min

## 2018-12-17 ENCOUNTER — OFFICE VISIT (OUTPATIENT)
Dept: PHYSICAL THERAPY | Age: 43
End: 2018-12-17
Attending: ORTHOPAEDIC SURGERY
Payer: COMMERCIAL

## 2018-12-17 PROCEDURE — 97110 THERAPEUTIC EXERCISES: CPT | Performed by: PHYSICAL THERAPIST

## 2018-12-17 PROCEDURE — 97140 MANUAL THERAPY 1/> REGIONS: CPT | Performed by: PHYSICAL THERAPIST

## 2018-12-17 NOTE — PROGRESS NOTES
Insurance: [employer] [facility]     Next MD visit: December 18, 2016       Fall Risk: standard  Medication Changes since last visit?: No             Precautions:  S/p achilles tendon repair September 26, 2018  Start motion and strenghtening in patient's current arc Therapeutic Exercise    Sci Fit (with shoe) weight through heel L5, distance 11 x 8 min    SLS on R 3 x 20 cts     Sitbacks 2 x 15     Lateral walk 4x20 feet    Reassessment completed      Neuromuscular Re-ed  Standing weightshift at barre 2x10: frontal, s to improve foot posture and normalize mechanics for stairclimbing. 4. Patient to improve FOTO outcomes score to less than or equal to 25% impairment, for functional improvement in ADLs.  Currently 66% impaired.      Plan:   Recommendations to continue PT 2

## 2018-12-18 ENCOUNTER — OFFICE VISIT (OUTPATIENT)
Dept: ORTHOPEDICS CLINIC | Facility: CLINIC | Age: 43
End: 2018-12-18

## 2018-12-18 ENCOUNTER — TELEPHONE (OUTPATIENT)
Dept: ORTHOPEDICS CLINIC | Facility: CLINIC | Age: 43
End: 2018-12-18

## 2018-12-18 DIAGNOSIS — S86.011A ACHILLES RUPTURE, RIGHT, INITIAL ENCOUNTER: Primary | ICD-10-CM

## 2018-12-18 PROCEDURE — 99212 OFFICE O/P EST SF 10 MIN: CPT | Performed by: ORTHOPAEDIC SURGERY

## 2018-12-18 PROCEDURE — 99024 POSTOP FOLLOW-UP VISIT: CPT | Performed by: ORTHOPAEDIC SURGERY

## 2018-12-19 NOTE — PROGRESS NOTES
This is a pleasant 75-year-old male that is 12 weeks status post a right Achilles tendon repair. He has had no fevers, chills, chest pain, shortness of breath. He has been participating in physical therapy and comes in today for repeat evaluation.     On

## 2018-12-20 ENCOUNTER — APPOINTMENT (OUTPATIENT)
Dept: PHYSICAL THERAPY | Age: 43
End: 2018-12-20
Attending: ORTHOPAEDIC SURGERY
Payer: COMMERCIAL

## 2018-12-20 ENCOUNTER — TELEPHONE (OUTPATIENT)
Dept: ORTHOPEDICS CLINIC | Facility: CLINIC | Age: 43
End: 2018-12-20

## 2018-12-20 NOTE — TELEPHONE ENCOUNTER
Pt was suppose to have physical therapy today the 20th and the 27th and wont be able to be seen because the referral was put in for 2019 not 2018 and [facility] wont approve the visits

## 2018-12-26 NOTE — TELEPHONE ENCOUNTER
Jose M Mayer states pt is scheduled for PT tomorrow, needs [facility] referral for 2018, also states office can call [facility] and give a verbal for this, only needs one more visit in 2018. Pls advise thank you.

## 2018-12-26 NOTE — TELEPHONE ENCOUNTER
S/w cesar and she states that when D put order in on 12/18/18 the order got processed for the new year 2019 and now pt hasn't been seen and needs 1 more visit for 2018. Will call [facility] to get them to process.    Called [facility] 3 times, no answer and could not LM

## 2018-12-27 ENCOUNTER — APPOINTMENT (OUTPATIENT)
Dept: PHYSICAL THERAPY | Age: 43
End: 2018-12-27
Attending: ORTHOPAEDIC SURGERY
Payer: COMMERCIAL

## 2019-01-03 ENCOUNTER — OFFICE VISIT (OUTPATIENT)
Dept: PHYSICAL THERAPY | Age: 44
End: 2019-01-03
Attending: ORTHOPAEDIC SURGERY
Payer: COMMERCIAL

## 2019-01-03 DIAGNOSIS — S86.011A ACHILLES RUPTURE, RIGHT, INITIAL ENCOUNTER: ICD-10-CM

## 2019-01-03 PROCEDURE — 97140 MANUAL THERAPY 1/> REGIONS: CPT

## 2019-01-03 PROCEDURE — 97110 THERAPEUTIC EXERCISES: CPT

## 2019-01-03 NOTE — PROGRESS NOTES
Insurance: Mike [employer] [facility]     Next MD visit: December 18, 2016       Fall Risk: standard  Medication Changes since last visit?: No             Precautions:  S/p achilles tendon repair September 26, 2018  Start motion and strenghtening in patient's current arc walk 4x20 feet    Monster walk 4x15 feet    Marching at barre 2x15      Neuromuscular Re-ed  Reformer (RY) heel raises; modified footwork Therapeutic Exercise    Gastroc stretch with towel 5 x 20 cts     R step up forward/lateral 20x     Neuromuscular Re-e

## 2019-01-07 ENCOUNTER — OFFICE VISIT (OUTPATIENT)
Dept: PHYSICAL THERAPY | Age: 44
End: 2019-01-07
Attending: ORTHOPAEDIC SURGERY
Payer: COMMERCIAL

## 2019-01-07 PROCEDURE — 97112 NEUROMUSCULAR REEDUCATION: CPT | Performed by: PHYSICAL THERAPIST

## 2019-01-07 PROCEDURE — 97110 THERAPEUTIC EXERCISES: CPT | Performed by: PHYSICAL THERAPIST

## 2019-01-07 PROCEDURE — 97140 MANUAL THERAPY 1/> REGIONS: CPT | Performed by: PHYSICAL THERAPIST

## 2019-01-07 NOTE — PROGRESS NOTES
Insurance: [employer] [facility]     Next MD visit: December 18, 2016       Fall Risk: standard  Medication Changes since last visit?: No             Precautions:  S/p achilles tendon repair September 26, 2018  Start motion and strenghtening in patient's current arc modified footwork Therapeutic Exercise    Gastroc stretch with towel 5 x 20 cts     R step up forward/lateral 20x     Neuromuscular Re-ed  Reformer (RY) heel raises (bilateral); squat (bilateral) 20x       Therapeutic Exercise    Gastroc stretch with towel TotalTreatment Time: 45 min

## 2019-01-10 ENCOUNTER — OFFICE VISIT (OUTPATIENT)
Dept: PHYSICAL THERAPY | Age: 44
End: 2019-01-10
Attending: ORTHOPAEDIC SURGERY
Payer: COMMERCIAL

## 2019-01-10 PROCEDURE — 97140 MANUAL THERAPY 1/> REGIONS: CPT | Performed by: PHYSICAL THERAPIST

## 2019-01-10 PROCEDURE — 97110 THERAPEUTIC EXERCISES: CPT | Performed by: PHYSICAL THERAPIST

## 2019-01-10 NOTE — PROGRESS NOTES
Insurance: [employer] [facility]     Next MD visit: December 18, 2016       Fall Risk: standard  Medication Changes since last visit?: No             Precautions:  S/p achilles tendon repair September 26, 2018  Start motion and strenghtening in patient's current arc feet    Monster walk 4x15 feet    Marching at barre 2x15      Neuromuscular Re-ed  Reformer (RY) heel raises; modified footwork Therapeutic Exercise    Gastroc stretch with towel 5 x 20 cts     R step up forward/lateral 20x     Neuromuscular Re-ed  Reforme impairment, for functional improvement in ADLs.  Currently 66% impaired.      Plan:   Recommendations to continue PT 2x per week x 6 weeks (12 visits) to increase function, decrease pain/swelling; improve scar mobility, R ankle ROM/mobility, flexibility, st

## 2019-01-15 ENCOUNTER — OFFICE VISIT (OUTPATIENT)
Dept: PHYSICAL THERAPY | Age: 44
End: 2019-01-15
Attending: ORTHOPAEDIC SURGERY
Payer: COMMERCIAL

## 2019-01-15 PROCEDURE — 97112 NEUROMUSCULAR REEDUCATION: CPT

## 2019-01-15 PROCEDURE — 97110 THERAPEUTIC EXERCISES: CPT

## 2019-01-15 NOTE — PROGRESS NOTES
Insurance: [facility]     Next MD visit: March 2018       Fall Risk: standard  Medication Changes since last visit?: No             Precautions:  S/p achilles tendon repair September 26, 2018  Start motion and strenghtening in patient's current arc of mot Therapeutic Exercise    Gastroc stretch on low incline 3 x 20 cts     Heel Raises 2x15    R step up forward and lateral 6\" 1x20 each    Side step GTB 4 x 30 ft     Monster walk GTB 4x30 feet    Neuromuscular Re-ed  Marching at [place] [facility] 2x15    Refo

## 2019-01-17 ENCOUNTER — OFFICE VISIT (OUTPATIENT)
Dept: PHYSICAL THERAPY | Age: 44
End: 2019-01-17
Attending: ORTHOPAEDIC SURGERY
Payer: COMMERCIAL

## 2019-01-17 PROCEDURE — 97110 THERAPEUTIC EXERCISES: CPT

## 2019-01-17 PROCEDURE — 97112 NEUROMUSCULAR REEDUCATION: CPT

## 2019-01-17 NOTE — PROGRESS NOTES
Insurance: Bryant Ennis O     Next MD visit: March 2018       Fall Risk: standard  Medication Changes since last visit?: No             Precautions:  S/p achilles tendon repair September 26, 2018  Start motion and strenghtening in patient's current arc of mot squat Bilateral>single)   Gastroc stretch on low incline 3 x 20 cts     High knee on bosu R 10x2    Lateral step down 2x10    Eccentric R PF x 3 (weakness)     Marching at barre (aeromat) 2 x 15 (No UE)    Side step GTB 4 x 30 ft     Monster walk GTB 4x30

## 2019-01-22 ENCOUNTER — OFFICE VISIT (OUTPATIENT)
Dept: PHYSICAL THERAPY | Age: 44
End: 2019-01-22
Attending: ORTHOPAEDIC SURGERY
Payer: COMMERCIAL

## 2019-01-22 PROCEDURE — 97110 THERAPEUTIC EXERCISES: CPT

## 2019-01-22 PROCEDURE — 97112 NEUROMUSCULAR REEDUCATION: CPT

## 2019-01-22 NOTE — PROGRESS NOTES
Insurance: [facility]     Next MD visit: March 2018       Fall Risk: standard  Medication Changes since last visit?: No             Precautions:  S/p achilles tendon repair September 26, 2018  Start motion and strenghtening in patient's current arc of mot (bilateral>single) (RRY)  Heel raises bilateral (RRY)  Heel raises single (RY)        Therapeutic exercise:    SciFit x 5 min     Gastroc stretch on low incline 3 x 20 cts     High knee on aeromat 10x2    R SL kicks on aeromat 3x6    Lateral step down 2x10

## 2019-01-24 ENCOUNTER — OFFICE VISIT (OUTPATIENT)
Dept: PHYSICAL THERAPY | Age: 44
End: 2019-01-24
Attending: ORTHOPAEDIC SURGERY
Payer: COMMERCIAL

## 2019-01-24 PROCEDURE — 97110 THERAPEUTIC EXERCISES: CPT

## 2019-01-24 PROCEDURE — 97112 NEUROMUSCULAR REEDUCATION: CPT

## 2019-01-24 NOTE — PROGRESS NOTES
Insurance: Higinio Huber [employer]IN     Next MD visit: March 2018       Fall Risk: standard  Medication Changes since last visit?: No             Precautions:  S/p achilles tendon repair September 26, 2018  Start motion and strenghtening in patient's current arc of mot Monster walk GTB 4x30 feet      NM Re-ed    Reformer 20x  squat (bilateral>single) (RRY)  Heel raises bilateral (RRY)  Heel raises single (R)   Single leg hops (RY)              Therapeutic exercise:    SciFit x 5 min     Gastroc stretch on low incline

## 2019-01-29 ENCOUNTER — APPOINTMENT (OUTPATIENT)
Dept: PHYSICAL THERAPY | Age: 44
End: 2019-01-29
Attending: ORTHOPAEDIC SURGERY
Payer: COMMERCIAL

## 2019-02-20 ENCOUNTER — OFFICE VISIT (OUTPATIENT)
Dept: PHYSICAL THERAPY | Age: 44
End: 2019-02-20
Attending: ORTHOPAEDIC SURGERY
Payer: COMMERCIAL

## 2019-02-20 PROCEDURE — 97530 THERAPEUTIC ACTIVITIES: CPT

## 2019-02-20 PROCEDURE — 97140 MANUAL THERAPY 1/> REGIONS: CPT

## 2019-02-20 PROCEDURE — 97110 THERAPEUTIC EXERCISES: CPT

## 2019-02-20 NOTE — PROGRESS NOTES
PROGRESS NOTE for HMO approval     Insurance: Mike Ridley O     Next MD visit: March 2018       Fall Risk: standard  Medication Changes since last visit?: No             Precautions:  S/p achilles tendon repair September 26, 2018    Subjective:    Pt reports R: 4+/5   Flexor Hallucis Longus R: 3+L: 5  R: 4-/5   Flexor Hallucis Brevis R: 4  L: 5  R: 4+/5   Flexor Digitorum R: 3+L: 5  R: 4/5   Posterior Tibialis R: 4-  L: 5  R: 4/5       Visit # 17  Date: 1/17/18 Visit # 18  Date: 1/22/18 Visit # 19  Date: 1/24/ progressive HEP during episode of care and upon discharge to aide with symptom management and return to previous level of function-PROG    2. Patient will demonstrate heel-toe gait pattern with ambulation on level surfaces >50 feet for ease with navigating

## 2019-02-27 ENCOUNTER — OFFICE VISIT (OUTPATIENT)
Dept: PHYSICAL THERAPY | Age: 44
End: 2019-02-27
Attending: ORTHOPAEDIC SURGERY
Payer: COMMERCIAL

## 2019-02-27 ENCOUNTER — TELEPHONE (OUTPATIENT)
Dept: ORTHOPEDICS CLINIC | Facility: CLINIC | Age: 44
End: 2019-02-27

## 2019-02-27 DIAGNOSIS — S86.011A ACHILLES RUPTURE, RIGHT, INITIAL ENCOUNTER: Primary | ICD-10-CM

## 2019-02-27 PROCEDURE — 97110 THERAPEUTIC EXERCISES: CPT

## 2019-02-27 PROCEDURE — 97112 NEUROMUSCULAR REEDUCATION: CPT

## 2019-02-27 PROCEDURE — 97530 THERAPEUTIC ACTIVITIES: CPT

## 2019-02-27 NOTE — TELEPHONE ENCOUNTER
Vijaya Mccann from [facility] calling, states she put in new request for more PT sent on 2/20 and 2/25, but is still waiting for it to be approved by [facility]. Pt is supposed to come in for PT today.

## 2019-02-27 NOTE — PROGRESS NOTES
Insurance: Farhad Jones [facility] (additional 8, total 28)     Next MD visit: March 2018       Fall Risk: standard  Medication Changes since last visit?: No             Precautions:  S/p achilles tendon repair September 26, 2018    Subjective:    Pt reports soreness Side step GTB 4 x 30 ft     Monster walk GTB 4x30 feet    R SLS forward reach 3 x 10    NM Re-ed    Reformer 20x  squat (bilateral>single) (RRY)  Heel raises bilateral (RRY)  Heel raises single (RY)   Single leg hops (RY) Therapeutic exercise:    (Reas

## 2019-03-06 ENCOUNTER — OFFICE VISIT (OUTPATIENT)
Dept: PHYSICAL THERAPY | Age: 44
End: 2019-03-06
Attending: ORTHOPAEDIC SURGERY
Payer: COMMERCIAL

## 2019-03-06 PROCEDURE — 97112 NEUROMUSCULAR REEDUCATION: CPT

## 2019-03-06 PROCEDURE — 97110 THERAPEUTIC EXERCISES: CPT

## 2019-03-06 NOTE — PROGRESS NOTES
Insurance: Rachid Rivers [facility] (additional 8, total 28)     Next MD visit: March 19, 2018       Fall Risk: standard  Medication Changes since last visit?: No             Precautions:  S/p R achilles tendon repair September 26, 2018    Subjective:    Pt notes less 10  Wall SAG 10x     R SL kicks on aeromat 3x5  SLS on aeromat 3x    Bosu lunges R/L 15x  Gastroc stretch on board 5 x 30 cts     Heel raises 30x  Up two/down one heel raises 10x     Gait training        Therapeutic exercise:    Wall SAG 10 x 5 cts    [place]

## 2019-03-13 ENCOUNTER — TELEPHONE (OUTPATIENT)
Dept: ORTHOPEDICS CLINIC | Facility: CLINIC | Age: 44
End: 2019-03-13

## 2019-03-13 ENCOUNTER — OFFICE VISIT (OUTPATIENT)
Dept: PHYSICAL THERAPY | Age: 44
End: 2019-03-13
Attending: ORTHOPAEDIC SURGERY
Payer: COMMERCIAL

## 2019-03-13 DIAGNOSIS — S86.011A RUPTURE OF RIGHT ACHILLES TENDON, INITIAL ENCOUNTER: Primary | ICD-10-CM

## 2019-03-13 PROCEDURE — 97140 MANUAL THERAPY 1/> REGIONS: CPT

## 2019-03-13 PROCEDURE — 97110 THERAPEUTIC EXERCISES: CPT

## 2019-03-13 NOTE — PROGRESS NOTES
PROGRESS NOTE     Insurance: [employer] [facility] [place] 8, total 28)     Next MD visit: March 19, 2018       Fall Risk: standard  Medication Changes since last visit?: No             Precautions:  S/p R achilles tendon repair September 26, 2018    Subjective: Ankle DF R: 4+/5 R 5/5   Ankle Eversion R: 4+/5 R 5/5   Ankle Inversion R: 4+/5 R /5/5   Ankle Plantar flexion R: 3+/5 R 4-/5       Visit # 21  Date: 2/27/19 Visit # 22  Date: 3/6/19 Visit # 23  Date: 3/13/19   Therapeutic exercise:    PPU 2 x 10  Wall S Charges: TE x2, MM x 1 Total Timed Treatment: 42 min      TotalTreatment Time: 45 min

## 2019-03-19 ENCOUNTER — OFFICE VISIT (OUTPATIENT)
Dept: ORTHOPEDICS CLINIC | Facility: CLINIC | Age: 44
End: 2019-03-19

## 2019-03-19 ENCOUNTER — PATIENT MESSAGE (OUTPATIENT)
Dept: FAMILY MEDICINE CLINIC | Facility: CLINIC | Age: 44
End: 2019-03-19

## 2019-03-19 DIAGNOSIS — S86.019D RUPTURE OF ACHILLES TENDON, UNSPECIFIED LATERALITY, SUBSEQUENT ENCOUNTER: Primary | ICD-10-CM

## 2019-03-19 DIAGNOSIS — M72.2 PLANTAR FASCIITIS OF RIGHT FOOT: ICD-10-CM

## 2019-03-19 DIAGNOSIS — S86.011A RUPTURE OF RIGHT ACHILLES TENDON, INITIAL ENCOUNTER: Primary | ICD-10-CM

## 2019-03-19 PROCEDURE — 99213 OFFICE O/P EST LOW 20 MIN: CPT | Performed by: ORTHOPAEDIC SURGERY

## 2019-03-19 PROCEDURE — 99212 OFFICE O/P EST SF 10 MIN: CPT | Performed by: ORTHOPAEDIC SURGERY

## 2019-03-19 NOTE — PROGRESS NOTES
This is a pleasant 12-year-old male that is just under 6 months status post right Achilles tendon repair. He has had no chest pain or shortness of breath. He has been performing physical therapy.   The patient reports that he does have pain along the planta

## 2019-03-20 ENCOUNTER — OFFICE VISIT (OUTPATIENT)
Dept: PHYSICAL THERAPY | Age: 44
End: 2019-03-20
Attending: ORTHOPAEDIC SURGERY
Payer: COMMERCIAL

## 2019-03-20 PROCEDURE — 97110 THERAPEUTIC EXERCISES: CPT

## 2019-03-20 NOTE — TELEPHONE ENCOUNTER
From: Izaiah Hernandez  To:  Reba Dumont DO  Sent: 3/19/2019 4:54 PM CDT  Subject: Referral Request    Dr. Wally Del Cid said I need to see him in 3 months so my appointment is on  but the referral will  before then can you do another ref

## 2019-03-20 NOTE — PROGRESS NOTES
DISCHARGE SUMMARY     Insurance: [facility] (additional 8, total 28)     Next MD visit: June 2019       Fall Risk: standard  Medication Changes since last visit?: No             Precautions:  S/p R achilles tendon repair September 26, 2018    Subjective: 2x10  SLS on aeromat 3x 20 cts       Reformer 20x2  squat (bilateral>single) (RRY)  Heel raises bilateral (RRY)  Heel raises single (RY)  Therapeutic exercise:    Gastroc stretch 5 x 20 cts    SL kicks on aeromat 15x  R SLS with rotation 10x     Bilateral

## 2019-03-27 ENCOUNTER — APPOINTMENT (OUTPATIENT)
Dept: PHYSICAL THERAPY | Age: 44
End: 2019-03-27
Attending: ORTHOPAEDIC SURGERY
Payer: COMMERCIAL

## 2019-06-18 ENCOUNTER — OFFICE VISIT (OUTPATIENT)
Dept: ORTHOPEDICS CLINIC | Facility: CLINIC | Age: 44
End: 2019-06-18

## 2019-06-18 DIAGNOSIS — S86.011A RUPTURE OF RIGHT ACHILLES TENDON, INITIAL ENCOUNTER: Primary | ICD-10-CM

## 2019-06-18 PROCEDURE — 99212 OFFICE O/P EST SF 10 MIN: CPT | Performed by: ORTHOPAEDIC SURGERY

## 2019-06-18 PROCEDURE — 99213 OFFICE O/P EST LOW 20 MIN: CPT | Performed by: ORTHOPAEDIC SURGERY

## 2019-06-19 NOTE — PROGRESS NOTES
This is a pleasant 51-year-old male that is just under 9 months status post a right Achilles tendon repair. He has had no fevers, chills, chest pain, shortness of breath. He comes in today for repeat evaluation.     On exam, the patient has no palpable de

## 2021-03-21 ENCOUNTER — IMMUNIZATION (OUTPATIENT)
Dept: LAB | Age: 46
End: 2021-03-21
Attending: HOSPITALIST
Payer: COMMERCIAL

## 2021-03-21 DIAGNOSIS — Z23 NEED FOR VACCINATION: Primary | ICD-10-CM

## 2021-03-21 PROCEDURE — 0001A SARSCOV2 VAC 30MCG/0.3ML IM: CPT

## 2021-04-11 ENCOUNTER — IMMUNIZATION (OUTPATIENT)
Dept: LAB | Age: 46
End: 2021-04-11
Attending: HOSPITALIST
Payer: COMMERCIAL

## 2021-04-11 DIAGNOSIS — Z23 NEED FOR VACCINATION: Primary | ICD-10-CM

## 2021-04-11 PROCEDURE — 0002A SARSCOV2 VAC 30MCG/0.3ML IM: CPT

## 2022-04-07 ENCOUNTER — OFFICE VISIT (OUTPATIENT)
Dept: FAMILY MEDICINE CLINIC | Facility: CLINIC | Age: 47
End: 2022-04-07
Payer: COMMERCIAL

## 2022-04-07 VITALS
HEART RATE: 73 BPM | WEIGHT: 263 LBS | SYSTOLIC BLOOD PRESSURE: 126 MMHG | DIASTOLIC BLOOD PRESSURE: 83 MMHG | BODY MASS INDEX: 34.85 KG/M2 | HEIGHT: 73 IN

## 2022-04-07 DIAGNOSIS — N12 PYELONEPHRITIS: ICD-10-CM

## 2022-04-07 DIAGNOSIS — Z00.00 ROUTINE GENERAL MEDICAL EXAMINATION AT A HEALTH CARE FACILITY: Primary | ICD-10-CM

## 2022-04-07 PROCEDURE — 3074F SYST BP LT 130 MM HG: CPT | Performed by: FAMILY MEDICINE

## 2022-04-07 PROCEDURE — 3008F BODY MASS INDEX DOCD: CPT | Performed by: FAMILY MEDICINE

## 2022-04-07 PROCEDURE — 3079F DIAST BP 80-89 MM HG: CPT | Performed by: FAMILY MEDICINE

## 2022-04-07 PROCEDURE — 99386 PREV VISIT NEW AGE 40-64: CPT | Performed by: FAMILY MEDICINE

## 2022-04-07 RX ORDER — TAMSULOSIN HYDROCHLORIDE 0.4 MG/1
0.4 CAPSULE ORAL DAILY
COMMUNITY
Start: 2022-03-05 | End: 2022-04-07

## 2022-04-07 RX ORDER — TADALAFIL 5 MG/1
5 TABLET ORAL
Qty: 5 TABLET | Refills: 1 | Status: SHIPPED | OUTPATIENT
Start: 2022-04-07

## 2022-04-08 ENCOUNTER — LAB ENCOUNTER (OUTPATIENT)
Dept: LAB | Age: 47
End: 2022-04-08
Attending: FAMILY MEDICINE
Payer: COMMERCIAL

## 2022-04-08 DIAGNOSIS — Z00.00 ROUTINE GENERAL MEDICAL EXAMINATION AT A HEALTH CARE FACILITY: ICD-10-CM

## 2022-04-08 LAB
ALBUMIN SERPL-MCNC: 3.9 G/DL (ref 3.4–5)
ALBUMIN/GLOB SERPL: 1.3 {RATIO} (ref 1–2)
ALP LIVER SERPL-CCNC: 79 U/L
ALT SERPL-CCNC: 39 U/L
ANION GAP SERPL CALC-SCNC: 4 MMOL/L (ref 0–18)
AST SERPL-CCNC: 15 U/L (ref 15–37)
BASOPHILS # BLD AUTO: 0.03 X10(3) UL (ref 0–0.2)
BASOPHILS NFR BLD AUTO: 0.5 %
BILIRUB SERPL-MCNC: 0.4 MG/DL (ref 0.1–2)
BILIRUB UR QL: NEGATIVE
BUN BLD-MCNC: 18 MG/DL (ref 7–18)
BUN/CREAT SERPL: 16.5 (ref 10–20)
CALCIUM BLD-MCNC: 9 MG/DL (ref 8.5–10.1)
CHLORIDE SERPL-SCNC: 108 MMOL/L (ref 98–112)
CHOLEST SERPL-MCNC: 173 MG/DL (ref ?–200)
CLARITY UR: CLEAR
CO2 SERPL-SCNC: 28 MMOL/L (ref 21–32)
COLOR UR: YELLOW
COMPLEXED PSA SERPL-MCNC: 2.51 NG/ML (ref ?–4)
CREAT BLD-MCNC: 1.09 MG/DL
DEPRECATED RDW RBC AUTO: 40.4 FL (ref 35.1–46.3)
EOSINOPHIL # BLD AUTO: 0.07 X10(3) UL (ref 0–0.7)
EOSINOPHIL NFR BLD AUTO: 1.1 %
ERYTHROCYTE [DISTWIDTH] IN BLOOD BY AUTOMATED COUNT: 12.3 % (ref 11–15)
FASTING PATIENT LIPID ANSWER: YES
FASTING STATUS PATIENT QL REPORTED: YES
GLOBULIN PLAS-MCNC: 3 G/DL (ref 2.8–4.4)
GLUCOSE BLD-MCNC: 113 MG/DL (ref 70–99)
GLUCOSE UR-MCNC: NEGATIVE MG/DL
HCT VFR BLD AUTO: 44.6 %
HDLC SERPL-MCNC: 38 MG/DL (ref 40–59)
HGB BLD-MCNC: 15 G/DL
HGB UR QL STRIP.AUTO: NEGATIVE
IMM GRANULOCYTES # BLD AUTO: 0.01 X10(3) UL (ref 0–1)
IMM GRANULOCYTES NFR BLD: 0.2 %
KETONES UR-MCNC: NEGATIVE MG/DL
LDLC SERPL CALC-MCNC: 119 MG/DL (ref ?–100)
LEUKOCYTE ESTERASE UR QL STRIP.AUTO: NEGATIVE
LYMPHOCYTES # BLD AUTO: 2.44 X10(3) UL (ref 1–4)
LYMPHOCYTES NFR BLD AUTO: 38.7 %
MCH RBC QN AUTO: 30.1 PG (ref 26–34)
MCHC RBC AUTO-ENTMCNC: 33.6 G/DL (ref 31–37)
MCV RBC AUTO: 89.6 FL
MONOCYTES # BLD AUTO: 0.76 X10(3) UL (ref 0.1–1)
MONOCYTES NFR BLD AUTO: 12.1 %
NEUTROPHILS # BLD AUTO: 2.99 X10 (3) UL (ref 1.5–7.7)
NEUTROPHILS # BLD AUTO: 2.99 X10(3) UL (ref 1.5–7.7)
NEUTROPHILS NFR BLD AUTO: 47.4 %
NITRITE UR QL STRIP.AUTO: NEGATIVE
NONHDLC SERPL-MCNC: 135 MG/DL (ref ?–130)
OSMOLALITY SERPL CALC.SUM OF ELEC: 293 MOSM/KG (ref 275–295)
PH UR: 6 [PH] (ref 5–8)
PLATELET # BLD AUTO: 256 10(3)UL (ref 150–450)
POTASSIUM SERPL-SCNC: 4.4 MMOL/L (ref 3.5–5.1)
PROT SERPL-MCNC: 6.9 G/DL (ref 6.4–8.2)
PROT UR-MCNC: NEGATIVE MG/DL
RBC # BLD AUTO: 4.98 X10(6)UL
SODIUM SERPL-SCNC: 140 MMOL/L (ref 136–145)
SP GR UR STRIP: 1.02 (ref 1–1.03)
TRIGL SERPL-MCNC: 83 MG/DL (ref 30–149)
UROBILINOGEN UR STRIP-ACNC: <2
VIT C UR-MCNC: 20 MG/DL
VLDLC SERPL CALC-MCNC: 15 MG/DL (ref 0–30)
WBC # BLD AUTO: 6.3 X10(3) UL (ref 4–11)

## 2022-04-08 PROCEDURE — 80061 LIPID PANEL: CPT

## 2022-04-08 PROCEDURE — 80053 COMPREHEN METABOLIC PANEL: CPT

## 2022-04-08 PROCEDURE — 81001 URINALYSIS AUTO W/SCOPE: CPT

## 2022-04-08 PROCEDURE — 85025 COMPLETE CBC W/AUTO DIFF WBC: CPT

## 2022-04-08 PROCEDURE — 36415 COLL VENOUS BLD VENIPUNCTURE: CPT

## 2023-11-08 NOTE — PROGRESS NOTES
UROLOGY PROCEDURE NOTE      PREOPERATIVE DIAGNOSIS:          Desires voluntary sterilization - bilateral vasectomy. POSTOPERATIVE DIAGNOSIS:         Same. PROCEDURE PERFORMED: Voluntary sterilization - bilateral vasectomy.       ANESTHESIA:   Meli Lee case, the wound was closed using a suture of 3-0 chromic followed by antibiotic ointment, a small Telfa dressing, then a small 2 x 2 sterile dressing, then Tegaderm dressing.   Each segment of vas deferens was sent separately in formalin to pathology for id Spot Size: Finesse Adapter Size: 15 x 15 mm square

## (undated) DIAGNOSIS — R73.09 ELEVATED GLUCOSE: ICD-10-CM

## (undated) DIAGNOSIS — N40.0 BENIGN PROSTATIC HYPERPLASIA, UNSPECIFIED WHETHER LOWER URINARY TRACT SYMPTOMS PRESENT: Primary | ICD-10-CM

## (undated) DEVICE — SUTURE VICRYL 2-0 FS-1

## (undated) DEVICE — GAUZE SPONGES,12 PLY: Brand: CURITY

## (undated) DEVICE — COTTON UNDERCAST PADDING,REGULAR FINISH: Brand: WEBRIL

## (undated) DEVICE — GOWN SURG AERO BLUE PERF LG

## (undated) DEVICE — TETRA-FLEX CF WOVEN LATEX FREE ELASTIC BANDAGE 4" X 5.5 YD: Brand: TETRA-FLEX™CF

## (undated) DEVICE — NON-ADHERENT STRIPS,OIL EMULSION: Brand: CURITY

## (undated) DEVICE — STERILE LATEX POWDER-FREE SURGICAL GLOVESWITH NITRILE COATING: Brand: PROTEXIS

## (undated) DEVICE — CHLORAPREP 26ML APPLICATOR

## (undated) DEVICE — ZIMMER® STERILE DISPOSABLE TOURNIQUET CUFF WITH PLC, DUAL PORT, SINGLE BLADDER, 30 IN. (76 CM)

## (undated) DEVICE — REM POLYHESIVE ADULT PATIENT RETURN ELECTRODE: Brand: VALLEYLAB

## (undated) DEVICE — LOWER EXTREMITY: Brand: MEDLINE INDUSTRIES, INC.

## (undated) DEVICE — SPLINT PRECUT SYNTH 5X30

## (undated) DEVICE — SUTURE FIBERWIRE S AR-7200

## (undated) DEVICE — SUTURE VICRYL 0 CP-1

## (undated) DEVICE — CLIPPER BLADE 3M

## (undated) DEVICE — SUTURE ETHILON 3-0 669H

## (undated) DEVICE — DRAPE SRG 70X60IN SPLT U IMPRV

## (undated) DEVICE — SOL  .9 1000ML BTL

## (undated) DEVICE — INTENDED FOR TISSUE SEPARATION, AND OTHER PROCEDURES THAT REQUIRE A SHARP SURGICAL BLADE TO PUNCTURE OR CUT.: Brand: BARD-PARKER ® STAINLESS STEEL BLADES

## (undated) DEVICE — SUTURE VICRYL 3-0 J442H

## (undated) NOTE — MR AVS SNAPSHOT
[facility]  [address]  [phone]               Thank you for choosing us for your health care visit with Kevin Sommers MD.  We are glad to serve you and happy to provide you with this summ This list is accurate as of: 6/16/17  3:26 PM.  Always use your most recent med list.                MULTIVITAMIN ADULT OR   Take by mouth. NF FORMULAS TESTOSTERONE OR   Take by mouth. OMEGA 3 OR   Take by mouth.            PROBIOTIC

## (undated) NOTE — MR AVS SNAPSHOT
[facility]  [address]  [phone]               Thank you for choosing us for your health care visit with Mohsen English MD.  We are glad to serve you and happy to provide you with this summ 8.  Please do not drink any caffeinated beverages on the morning of the procedure; you may have them after the procedure    9.   Please eat breakfast or lunch before the procedure because we will not be putting you to sleep, because otherwise the medication Take 2 tablets by mouth  WHEN YOU ARRIVE AT THE OFFICE   for your procedure; you must have a . After the procedure, take 1 tablet every 4-6 hours for pain. Commonly known as:  NORCO           MULTIVITAMIN ADULT OR   Take by mouth.            NF FOR Eat plenty of protein, keep the fat content low Sugars:  sodas and sports drinks, candies and desserts   Eat plenty of low-fat dairy products High fat meats and dairy   Choose whole grain products Foods high in sodium   Water is best for hydration Fast marisol

## (undated) NOTE — Clinical Note
January 12, 2017         Yakelin Lilly DO  [address] [facility]      Patient: Donald Alonzo   YOB: 1975   Date of Visit: 1/11/2017       Dear Dr. Anabell Gloria DO,    Many thanks for referring Donald Alonzo to my practice

## (undated) NOTE — Clinical Note
Ev Holcomb  [address] [facility]       01/11/2017        Patient: Tesha Garrison   YOB: 1975   Date of Visit: 1/11/2017       Dear  Dr. Efra Conrad DO,      Many thanks for referring Tesha Garrison to my pract

## (undated) NOTE — MR AVS SNAPSHOT
[facility]  [address]  [phone]               Thank you for choosing us for your health care visit with Pam Marsh MD.  We are glad to serve you and happy to provide you with this summ BP Pulse Temp Height Weight BMI    108/76 mmHg 69 97.4 °F (36.3 °C) (Oral) 6' 0.5\" (1.842 m) 250 lb (113.399 kg) 33.42 kg/m2         Current Medications          This list is accurate as of: 4/28/17  9:51 AM.  Always use your most recent med list.